# Patient Record
Sex: MALE | Race: WHITE | NOT HISPANIC OR LATINO | Employment: OTHER | URBAN - METROPOLITAN AREA
[De-identification: names, ages, dates, MRNs, and addresses within clinical notes are randomized per-mention and may not be internally consistent; named-entity substitution may affect disease eponyms.]

---

## 2017-10-01 ENCOUNTER — HOSPITAL ENCOUNTER (EMERGENCY)
Facility: HOSPITAL | Age: 62
Discharge: OTHER INSTITUTION | End: 2017-10-02
Attending: EMERGENCY MEDICINE
Payer: MEDICAID

## 2017-10-01 DIAGNOSIS — J18.9 PNEUMONIA OF BOTH LUNGS DUE TO INFECTIOUS ORGANISM, UNSPECIFIED PART OF LUNG: ICD-10-CM

## 2017-10-01 DIAGNOSIS — A41.9 SEPSIS, DUE TO UNSPECIFIED ORGANISM: Primary | ICD-10-CM

## 2017-10-01 DIAGNOSIS — R31.9 URINARY TRACT INFECTION WITH HEMATURIA, SITE UNSPECIFIED: ICD-10-CM

## 2017-10-01 DIAGNOSIS — N20.0 NEPHROLITHIASIS: ICD-10-CM

## 2017-10-01 DIAGNOSIS — Q07.8: ICD-10-CM

## 2017-10-01 DIAGNOSIS — T68.XXXA HYPOTHERMIA, INITIAL ENCOUNTER: ICD-10-CM

## 2017-10-01 DIAGNOSIS — N39.0 URINARY TRACT INFECTION WITH HEMATURIA, SITE UNSPECIFIED: ICD-10-CM

## 2017-10-01 DIAGNOSIS — D64.9 ANEMIA, UNSPECIFIED TYPE: ICD-10-CM

## 2017-10-01 LAB
ALBUMIN SERPL BCP-MCNC: 2.8 G/DL
ALP SERPL-CCNC: 192 U/L
ALT SERPL W/O P-5'-P-CCNC: 33 U/L
ANION GAP SERPL CALC-SCNC: 11 MMOL/L
APTT BLDCRRT: 31.8 SEC
AST SERPL-CCNC: 43 U/L
BASOPHILS # BLD AUTO: 0 K/UL
BASOPHILS NFR BLD: 0 %
BILIRUB SERPL-MCNC: 0.3 MG/DL
BILIRUB UR QL STRIP: ABNORMAL
BUN SERPL-MCNC: 7 MG/DL
CALCIUM SERPL-MCNC: 9.9 MG/DL
CHLORIDE SERPL-SCNC: 89 MMOL/L
CLARITY UR: ABNORMAL
CO2 SERPL-SCNC: 30 MMOL/L
COLOR UR: ABNORMAL
CREAT SERPL-MCNC: 0.5 MG/DL
DIFFERENTIAL METHOD: ABNORMAL
EOSINOPHIL # BLD AUTO: 0 K/UL
EOSINOPHIL NFR BLD: 0.2 %
ERYTHROCYTE [DISTWIDTH] IN BLOOD BY AUTOMATED COUNT: 15.2 %
EST. GFR  (AFRICAN AMERICAN): >60 ML/MIN/1.73 M^2
EST. GFR  (NON AFRICAN AMERICAN): >60 ML/MIN/1.73 M^2
GLUCOSE SERPL-MCNC: 86 MG/DL
GLUCOSE UR QL STRIP: ABNORMAL
HCT VFR BLD AUTO: 25.3 %
HGB BLD-MCNC: 8.7 G/DL
HGB UR QL STRIP: ABNORMAL
INR PPP: 1
KETONES UR QL STRIP: ABNORMAL
LACTATE SERPL-SCNC: 1 MMOL/L
LEUKOCYTE ESTERASE UR QL STRIP: ABNORMAL
LYMPHOCYTES # BLD AUTO: 0.7 K/UL
LYMPHOCYTES NFR BLD: 13.5 %
MCH RBC QN AUTO: 33.7 PG
MCHC RBC AUTO-ENTMCNC: 34.4 G/DL
MCV RBC AUTO: 98 FL
MICROSCOPIC COMMENT: ABNORMAL
MONOCYTES # BLD AUTO: 0.6 K/UL
MONOCYTES NFR BLD: 11.1 %
NEUTROPHILS # BLD AUTO: 4.1 K/UL
NEUTROPHILS NFR BLD: 75.2 %
NITRITE UR QL STRIP: ABNORMAL
NON-SQ EPI CELLS #/AREA URNS HPF: 1 /HPF
PH UR STRIP: ABNORMAL [PH] (ref 5–8)
PLATELET # BLD AUTO: 119 K/UL
PMV BLD AUTO: 11.4 FL
POTASSIUM SERPL-SCNC: 3.9 MMOL/L
PROT SERPL-MCNC: 7.5 G/DL
PROT UR QL STRIP: ABNORMAL
PROTHROMBIN TIME: 10.1 SEC
RBC # BLD AUTO: 2.58 M/UL
RBC #/AREA URNS HPF: >100 /HPF (ref 0–4)
SODIUM SERPL-SCNC: 130 MMOL/L
SP GR UR STRIP: ABNORMAL (ref 1–1.03)
URN SPEC COLLECT METH UR: ABNORMAL
UROBILINOGEN UR STRIP-ACNC: ABNORMAL EU/DL
WBC # BLD AUTO: 5.49 K/UL
WBC #/AREA URNS HPF: 20 /HPF (ref 0–5)

## 2017-10-01 PROCEDURE — 96367 TX/PROPH/DG ADDL SEQ IV INF: CPT

## 2017-10-01 PROCEDURE — 80053 COMPREHEN METABOLIC PANEL: CPT

## 2017-10-01 PROCEDURE — 87040 BLOOD CULTURE FOR BACTERIA: CPT

## 2017-10-01 PROCEDURE — 96365 THER/PROPH/DIAG IV INF INIT: CPT

## 2017-10-01 PROCEDURE — 83605 ASSAY OF LACTIC ACID: CPT

## 2017-10-01 PROCEDURE — 85610 PROTHROMBIN TIME: CPT

## 2017-10-01 PROCEDURE — 85025 COMPLETE CBC W/AUTO DIFF WBC: CPT

## 2017-10-01 PROCEDURE — 81000 URINALYSIS NONAUTO W/SCOPE: CPT

## 2017-10-01 PROCEDURE — 99285 EMERGENCY DEPT VISIT HI MDM: CPT | Mod: 25

## 2017-10-01 PROCEDURE — 25000003 PHARM REV CODE 250: Performed by: EMERGENCY MEDICINE

## 2017-10-01 PROCEDURE — 96361 HYDRATE IV INFUSION ADD-ON: CPT | Mod: 59

## 2017-10-01 PROCEDURE — 96360 HYDRATION IV INFUSION INIT: CPT

## 2017-10-01 PROCEDURE — 85730 THROMBOPLASTIN TIME PARTIAL: CPT

## 2017-10-01 RX ORDER — FAMOTIDINE 20 MG/1
20 TABLET, FILM COATED ORAL 2 TIMES DAILY
COMMUNITY

## 2017-10-01 RX ORDER — CALCITONIN SALMON 200 [IU]/.09ML
1 SPRAY, METERED NASAL DAILY
COMMUNITY

## 2017-10-01 RX ORDER — AZITHROMYCIN 250 MG/1
1000 TABLET, FILM COATED ORAL
Status: DISCONTINUED | OUTPATIENT
Start: 2017-10-02 | End: 2017-10-02

## 2017-10-01 RX ORDER — SIMETHICONE 80 MG
160 TABLET,CHEWABLE ORAL 2 TIMES DAILY
COMMUNITY

## 2017-10-01 RX ORDER — POTASSIUM CITRATE AND CITRIC ACID MONOHYDRATE 1100; 334 MG/5ML; MG/5ML
SOLUTION ORAL 2 TIMES DAILY
COMMUNITY

## 2017-10-01 RX ORDER — FERROUS SULFATE 220 (44)/5
220 SOLUTION, ORAL ORAL DAILY
COMMUNITY

## 2017-10-01 RX ORDER — ASPIRIN 81 MG/1
81 TABLET ORAL DAILY
COMMUNITY

## 2017-10-01 RX ORDER — LACTULOSE 10 G/15ML
10 SOLUTION ORAL; RECTAL NIGHTLY
COMMUNITY

## 2017-10-01 RX ORDER — SODIUM CHLORIDE 9 MG/ML
1000 INJECTION, SOLUTION INTRAVENOUS
Status: COMPLETED | OUTPATIENT
Start: 2017-10-01 | End: 2017-10-02

## 2017-10-01 RX ORDER — CIPROFLOXACIN 500 MG/1
500 TABLET ORAL
COMMUNITY

## 2017-10-01 RX ORDER — POLYETHYLENE GLYCOL 3350 17 G/17G
17 POWDER, FOR SOLUTION ORAL DAILY
COMMUNITY

## 2017-10-01 RX ORDER — LAMOTRIGINE 150 MG/1
25 TABLET ORAL DAILY
COMMUNITY

## 2017-10-01 RX ORDER — LANOLIN ALCOHOL/MO/W.PET/CERES
400 CREAM (GRAM) TOPICAL DAILY
COMMUNITY

## 2017-10-01 RX ADMIN — SODIUM CHLORIDE 1000 ML: 0.9 INJECTION, SOLUTION INTRAVENOUS at 09:10

## 2017-10-01 RX ADMIN — SODIUM CHLORIDE 1000 ML: 0.9 INJECTION, SOLUTION INTRAVENOUS at 07:10

## 2017-10-01 NOTE — ED TRIAGE NOTES
EMS states they were called for hematuria that has been going on for at least a month.  Group home states he was put on Cipro this past Friday.  Reportedly the hematuria became darker today.

## 2017-10-01 NOTE — ED PROVIDER NOTES
SCRIBE #1 NOTE: I, Juan Carlos Orona, am scribing for, and in the presence of, Franklin Rao MD. I have scribed the HPI, ROS, and PEx.     SCRIBE #2 NOTE: I, Kaitlin Dubois, am scribing for, and in the presence of,  Johnson Perez MD. I have scribed the remaining portions of the note not scribed by Scribe #1.     History      Chief Complaint   Patient presents with    Hematuria     for 1 month,  Recently started on Cipro this past Friday       Review of patient's allergies indicates:  Allergies not on file     HPI   HPI    10/1/2017, 6:53 PM   History obtained from the EMS      History of Present Illness: Guido Crowder is a 61 y.o. male patient who presents to the Emergency Department, from [facility] [place], for hematuria which onset gradually 1 month ago. Symptoms are constant and moderate in severity. Per EMS, the group home staff reports that the pt has been experiencing these sxs for aprox 1 month, but reports that it has worsened recently. Per EMS, the group home staff feels the pt is more altered than normal. No mitigating or exacerbating factors reported. No other associated sxs reported. Ems states that the pt started Cipro on Friday. Patient denies any fever, chills, dysuria, changes in urinary frequency/ urgency, abdomina pain, n/v/d, and all other sxs at this time. No further complaints or concerns at this time.         Arrival mode: EMS    PCP: No primary care provider on file.       Past Medical History:  Past Medical History:   Diagnosis Date    Dysphagia     Hyperlipidemia     Neurogenic bladder     Neuronal migration disorder     Osteoporosis     Seizures        Past Surgical History:  History reviewed. No pertinent surgical history.      Family History:  History reviewed. No pertinent family history.    Social History:  Social History     Social History Main Topics    Smoking status: Never Smoker    Smokeless tobacco: Never Used    Alcohol use No    Drug use: No    Sexual activity: No        ROS   Review of Systems   Constitutional: Negative for chills and fever.   Respiratory: Negative for shortness of breath.    Cardiovascular: Negative for chest pain.   Gastrointestinal: Negative for abdominal pain, constipation, diarrhea, nausea and vomiting.   Genitourinary: Positive for hematuria. Negative for difficulty urinating, dysuria, flank pain, frequency and urgency.   Musculoskeletal: Negative for back pain.   Skin: Negative for rash.   Neurological: Negative for dizziness, syncope, weakness and headaches.   All other systems reviewed and are negative.      Physical Exam      Initial Vitals   BP Pulse Resp Temp SpO2   10/01/17 1855 10/01/17 1855 10/01/17 1855 10/01/17 2047 10/01/17 1855   (!) 138/56 70 18 (!) 93.3 °F (34.1 °C) 98 %      MAP       10/01/17 1855       83.33           Vitals:    10/02/17 0131   BP: 102/61   Pulse: 96   Resp:    Temp:          Physical Exam  Nursing Notes and Vital Signs Reviewed.  Constitutional: Patient is in no apparent distress. Well-developed and well-nourished.  Head: Atraumatic. Normocephalic.  Eyes: PERRL. EOM intact. Conjunctivae are not pale. No scleral icterus.  ENT: Mucous membranes are dry. Oropharynx is clear and symmetric.    Neck: Supple. Full ROM. No lymphadenopathy.  Cardiovascular: Regular rate. Regular rhythm. No murmurs, rubs, or gallops. Distal pulses are 2+ and symmetric.  Pulmonary/Chest: No respiratory distress. Clear to auscultation bilaterally. No wheezing, rales, or rhonchi.  Abdominal: Soft. Slight abdominal distension and tenderness appreciated. No rebound, guarding, or rigidity. Good bowel sounds. Suprapubic cathter in place  : External inspection is normal. No blood visualized on the penis.  Genitourinary: No CVA tenderness  Musculoskeletal: Moves all extremities. No obvious deformities. No edema. No calf tenderness.  Skin: Warm and dry.  Neurological:  Alert, awake, and appropriate.  Normal speech.  No acute focal neurological deficits  "are appreciated.  Psychiatric: Normal affect. Good eye contact. Appropriate in content.    ED Course    Critical Care  Date/Time: 10/2/2017 12:44 AM  Performed by: OCTAVIO ZAPATA  Authorized by: OCTAVIO ZAPATA   Direct patient critical care time: 8 minutes  Additional history critical care time: 8 minutes  Ordering / reviewing critical care time: 8 minutes  Documentation critical care time: 8 minutes  Consulting other physicians critical care time: 8 minutes  Total critical care time (exclusive of procedural time) : 40 minutes  Critical care time was exclusive of separately billable procedures and treating other patients.  Critical care was necessary to treat or prevent imminent or life-threatening deterioration of the following conditions: sepsis.  Critical care was time spent personally by me on the following activities: blood draw for specimens, development of treatment plan with patient or surrogate, discussions with consultants, discussions with primary provider, evaluation of patient's response to treatment, examination of patient, obtaining history from patient or surrogate, ordering and performing treatments and interventions, ordering and review of laboratory studies, ordering and review of radiographic studies and re-evaluation of patient's condition.  Subsequent provider of critical care: I assumed direction of critical care for this patient from another provider of my specialty.        ED Vital Signs:  Vitals:    10/01/17 1855 10/01/17 1935 10/01/17 1948 10/01/17 2023   BP: (!) 138/56   (!) 115/54   Pulse: 70 72 66 68   Resp: 18      Temp:       TempSrc:       SpO2: 98%  99% 99%   Weight:       Height:        10/01/17 2031 10/01/17 2047 10/01/17 2121 10/01/17 2141   BP:   121/68 118/62   Pulse:   68 70   Resp:       Temp:  (!) 93.3 °F (34.1 °C)     TempSrc:  Core Rectal     SpO2:   96% 96%   Weight: 64 kg (141 lb)      Height: 5' 1.5" (1.562 m)       10/01/17 2241 10/02/17 0001 10/02/17 0031 " 10/02/17 0131   BP: (!) 119/53 119/81 (!) 120/50 102/61   Pulse: 72 87 82 96   Resp:       Temp:  (!) 95 °F (35 °C)     TempSrc:  Core Rectal     SpO2: 98% 99% 97% 97%   Weight:       Height:           Abnormal Lab Results:  Labs Reviewed   CBC W/ AUTO DIFFERENTIAL - Abnormal; Notable for the following:        Result Value    RBC 2.58 (*)     Hemoglobin 8.7 (*)     Hematocrit 25.3 (*)     MCH 33.7 (*)     RDW 15.2 (*)     Platelets 119 (*)     Lymph # 0.7 (*)     Gran% 75.2 (*)     Lymph% 13.5 (*)     All other components within normal limits   COMPREHENSIVE METABOLIC PANEL - Abnormal; Notable for the following:     Sodium 130 (*)     Chloride 89 (*)     CO2 30 (*)     Albumin 2.8 (*)     Alkaline Phosphatase 192 (*)     AST 43 (*)     All other components within normal limits   URINALYSIS - Abnormal; Notable for the following:     Color, UA Red (*)     Appearance, UA Cloudy (*)     All other components within normal limits   URINALYSIS MICROSCOPIC - Abnormal; Notable for the following:     RBC, UA >100 (*)     WBC, UA 20 (*)     Non-Squam Epith 1 (*)     All other components within normal limits   CULTURE, BLOOD   CULTURE, BLOOD   PROTIME-INR   APTT   LACTIC ACID, PLASMA        All Lab Results:  Results for orders placed or performed during the hospital encounter of 10/01/17   CBC auto differential   Result Value Ref Range    WBC 5.49 3.90 - 12.70 K/uL    RBC 2.58 (L) 4.60 - 6.20 M/uL    Hemoglobin 8.7 (L) 14.0 - 18.0 g/dL    Hematocrit 25.3 (L) 40.0 - 54.0 %    MCV 98 82 - 98 fL    MCH 33.7 (H) 27.0 - 31.0 pg    MCHC 34.4 32.0 - 36.0 g/dL    RDW 15.2 (H) 11.5 - 14.5 %    Platelets 119 (L) 150 - 350 K/uL    MPV 11.4 9.2 - 12.9 fL    Gran # 4.1 1.8 - 7.7 K/uL    Lymph # 0.7 (L) 1.0 - 4.8 K/uL    Mono # 0.6 0.3 - 1.0 K/uL    Eos # 0.0 0.0 - 0.5 K/uL    Baso # 0.00 0.00 - 0.20 K/uL    Gran% 75.2 (H) 38.0 - 73.0 %    Lymph% 13.5 (L) 18.0 - 48.0 %    Mono% 11.1 4.0 - 15.0 %    Eosinophil% 0.2 0.0 - 8.0 %    Basophil%  0.0 0.0 - 1.9 %    Differential Method Automated    Comprehensive metabolic panel   Result Value Ref Range    Sodium 130 (L) 136 - 145 mmol/L    Potassium 3.9 3.5 - 5.1 mmol/L    Chloride 89 (L) 95 - 110 mmol/L    CO2 30 (H) 23 - 29 mmol/L    Glucose 86 70 - 110 mg/dL    BUN, Bld 7 6 - 20 mg/dL    Creatinine 0.5 0.5 - 1.4 mg/dL    Calcium 9.9 8.7 - 10.5 mg/dL    Total Protein 7.5 6.0 - 8.4 g/dL    Albumin 2.8 (L) 3.5 - 5.2 g/dL    Total Bilirubin 0.3 0.1 - 1.0 mg/dL    Alkaline Phosphatase 192 (H) 55 - 135 U/L    AST 43 (H) 10 - 40 U/L    ALT 33 10 - 44 U/L    Anion Gap 11 8 - 16 mmol/L    eGFR if African American >60 >60 mL/min/1.73 m^2    eGFR if non African American >60 >60 mL/min/1.73 m^2   Protime-INR   Result Value Ref Range    Prothrombin Time 10.1 9.0 - 12.5 sec    INR 1.0 0.8 - 1.2   APTT   Result Value Ref Range    aPTT 31.8 21.0 - 32.0 sec   Urinalysis Catheterized   Result Value Ref Range    Specimen UA Urine, Catheterized     Color, UA Red (A) Yellow, Straw, Sabrina    Appearance, UA Cloudy (A) Clear    pH, UA SEE COMMENT 5.0 - 8.0    Specific Gravity, UA SEE COMMENT 1.005 - 1.030    Protein, UA SEE COMMENT Negative    Glucose, UA SEE COMMENT Negative    Ketones, UA SEE COMMENT Negative    Bilirubin (UA) SEE COMMENT Negative    Occult Blood UA SEE COMMENT Negative    Nitrite, UA SEE COMMENT Negative    Urobilinogen, UA SEE COMMENT <2.0 EU/dL    Leukocytes, UA SEE COMMENT Negative   Urinalysis Microscopic   Result Value Ref Range    RBC, UA >100 (H) 0 - 4 /hpf    WBC, UA 20 (H) 0 - 5 /hpf    Non-Squam Epith 1 (A) <1/hpf /hpf    Microscopic Comment SEE COMMENT    Lactic acid, plasma   Result Value Ref Range    Lactate (Lactic Acid) 1.0 0.5 - 2.2 mmol/L       Imaging Results:  Imaging Results          X-Ray Chest AP Portable (Final result)  Result time 10/01/17 21:34:39    Final result by Chas May MD (10/01/17 21:34:39)                 Impression:      Patchy infiltrate/pneumonia right mid lung and  left lung base.      Electronically signed by: MISHEL MAY MD  Date:     10/01/17  Time:    21:34              Narrative:    EXAM: XR CHEST AP PORTABLE    CLINICAL HISTORY: hypothermia.    COMPARISON STUDIES: none    FINDINGS:  The cardiomediastinal silhouette is within normal limits.  Patchy infiltrate left lung base and right midlung.  Device with battery pack superior left axilla with leads extending into the lower neck.    Mild dextroscoliosis with multilevel spondylosis                             CT Renal Stone Study Abd Pelvis WO (Final result)  Result time 10/01/17 20:27:29    Final result by Mishel May MD (10/01/17 20:27:29)                 Impression:      Bilateral nephrolithiasis.  Numerous nonobstructing calculi with postsurgical changes on the right - negative for ureteral calculus or hydronephrosis.    Suprapubic catheter with the bladder mostly collapsed.  Large amount of probable clot/blood in the bladder.  Certainly underlying mass cannot be excluded.    Patchy nodular at  the lung bases most suggestive of pneumonia.  This will need imaging followup.    Other chronic findings as described.            All CT scans at this facility use dose modulation, iterative reconstruction, and/or weight based dosing when appropriate to reduce radiation dose to as low as reasonably achievable.      Electronically signed by: MISHEL MAY MD  Date:     10/01/17  Time:    20:27              Narrative:    EXAM: CT RENAL STONE STUDY ABD PELVIS WO    CLINICAL HISTORY: hematuria     TECHNIQUE: Axial CT scan through the abdomen and pelvis without IV and without oral contrast.    COMPARISON STUDIES: none    FINDINGS:    Abdomen CT:   There are numerous small nonobstructing calculi throughout the kidneys.  The largest in the right renal pelvis is approximately 9 mm.  Small linear tract extending posteriorly from this with some small calcifications and/or surgical clips in it suggesting previous intervention.  There is  no hydronephrosis or ureteral calculus.    Patchy areas of nodular consolidation at the lung bases primarily in the dependent lower lobes.  Coronary arterial calcifications.  There is some colonic interposition between the liver and the right anterior abdominal wall.  No contour deformities of the abdominal viscera.  Atrophic pancreas.  Tiny gallstones.  Gastrostomy tube present.  Stomach moderately distended with fluid.  Nonobstructed gas pattern.  Colonic diverticulosis.  Significant calcified plaque  vasculature.  Negative aortic aneurysm.    Pelvis CT:   Suprapubic catheter in the bladder which is mostly collapsed.  There is a large amount of dense material around a Espinosa catheter likely indicative of blood.  Prostate is normal size.  There are no pelvic fluid collections or adenopathy.  Moderately distended rectosigmoid with fluid and stool.    Arthritic changes in the spine.  Changes also present suggesting ankylosing spondylitis.                             CT Head Without Contrast (Final result)  Result time 10/01/17 20:06:55    Final result by Mishel May MD (10/01/17 20:06:55)                 Impression:      Negative for acute intracranial abnormality.          All CT scans at this facility use dose modulation, iterative reconstruction, and/or weight based dosing when appropriate to reduce radiation dose to as low as reasonably achievable.      Electronically signed by: MISHEL MAY MD  Date:     10/01/17  Time:    20:06              Narrative:    EXAM: CT HEAD WITHOUT CONTRAST    CLINICAL HISTORY:  Transient alteration of awareness    TECHNIQUE: Axial noncontrast head CT images obtained.    COMPARISON STUDIES: none    FINDINGS:  Negative for acute hemorrhage, extra-axial collection, mass effect.   Good gray/white matter differentiation.  Skull is intact with no fractures identified.   Paranasal sinuses appear clear.    Some images limited by motion.                                      The Emergency  Provider reviewed the vital signs and test results, which are outlined above.    ED Discussion     8:14 PM: Dr. Rao transfers care of pt to Dr. Perez, pending lab and imaging results.    8:50 PM: Pt's initial vitals now demonstrate temperature of 93.3F rectal. Due to apparent systemic response, sepsis workup will be initiated. Will begin warming with fluid warmer and merrill hugger.    11:30 PM: Re-evaluated pt. Pt is resting comfortably and is in no acute distress. Findings support a diagnosis of sepsis but not severe sepsis due to a combination of pneumonia and UTI.  There are also concerns regarding anemia given ongoing hematuria.  Catheter has been irrigated to clear twice but patient continues to have bleeding.    11:40 PM: Discussed case with Brown Jaramillo, NP (Hospital Medicine), agrees with current care and management of pt and accepts admission. Azithromycin and Rocephin was recommended.   Admitting Service: Hospital medicine   Admitting Physician: Dr. Tillman  Admit to: ICU    12:56 AM:  Re-evaluated pt. On exam, PEG tube and suprapubic catheter in place, both sites are free of signs of infection. Dr. Tillman at bedside. Dr. Tillman recommends transferring patient to facility with urology due to ongoing hematuria and anemia.     1:33 AM: Consult with Dr. Sykes (ER physician) at [facility] concerning pt. There are no urology services, which the patient requires, offered at [facility] at this time. Dr. Sykes expresses understanding and will accept transfer.  Accepting Facility: [facility]  Accepting Physician: Dr. Sykes (via [facility])      ED Medication(s):  Medications   metronidazole IVPB 500 mg (500 mg Intravenous New Bag 10/2/17 0138)   0.9%  NaCl infusion (1,000 mLs Intravenous New Bag 10/1/17 1927)   sodium chloride 0.9% bolus 1,000 mL (0 mLs Intravenous Stopped 10/1/17 2248)   sodium chloride 0.9% bolus 1,000 mL (0 mLs Intravenous Stopped 10/1/17 2148)   cefTRIAXone (ROCEPHIN) 1 g in dextrose 5 % 50 mL IVPB (0 g  Intravenous Stopped 10/2/17 0045)   azithromycin 500 mg in dextrose 5 % 250 mL IVPB (ready to mix system) (0 mg Intravenous Stopped 10/2/17 0114)       New Prescriptions    No medications on file             Medical Decision Making    Medical Decision Making:   Clinical Tests:   Lab Tests: Ordered and Reviewed  Radiological Study: Ordered and Reviewed           Scribe Attestation:   Scribe #1: I performed the above scribed service and the documentation accurately describes the services I performed. I attest to the accuracy of the note.    Attending:   Physician Attestation Statement for Scribe #1: I, Franklin Rao MD, personally performed the services described in this documentation, as scribed by Juan Carlos Orona, in my presence, and it is both accurate and complete.       Scribe Attestation:   Scribe #2: I performed the above scribed service and the documentation accurately describes the services I performed. I attest to the accuracy of the note.    Attending Attestation:           Physician Attestation for Scribe:    Physician Attestation Statement for Scribe #2: I, Johnson Perez MD, reviewed documentation, as scribed by Kaitlin Dubois in my presence, and it is both accurate and complete. I also acknowledge and confirm the content of the note done by Scribe #1.          Clinical Impression       ICD-10-CM ICD-9-CM   1. Sepsis, due to unspecified organism A41.9 038.9     995.91   2. Pneumonia of both lungs due to infectious organism, unspecified part of lung J18.9 483.8   3. Urinary tract infection with hematuria, site unspecified N39.0 599.0    R31.9    4. Nephrolithiasis N20.0 592.0   5. Neuronal migration disorder Q07.8 742.8   6. Hypothermia, initial encounter T68.XXXA 991.6   7. Anemia, unspecified type D64.9 285.9       Disposition:   Disposition: Transferred  Condition: Fair  Reason for referral: No urology services available at this time         Johnson Perez MD  10/02/17 0144

## 2017-10-02 VITALS
OXYGEN SATURATION: 97 % | BODY MASS INDEX: 25.95 KG/M2 | SYSTOLIC BLOOD PRESSURE: 102 MMHG | RESPIRATION RATE: 18 BRPM | TEMPERATURE: 97 F | HEART RATE: 96 BPM | WEIGHT: 141 LBS | DIASTOLIC BLOOD PRESSURE: 61 MMHG | HEIGHT: 62 IN

## 2017-10-02 PROCEDURE — 96367 TX/PROPH/DG ADDL SEQ IV INF: CPT

## 2017-10-02 PROCEDURE — 63600175 PHARM REV CODE 636 W HCPCS: Performed by: EMERGENCY MEDICINE

## 2017-10-02 PROCEDURE — 25000003 PHARM REV CODE 250: Performed by: EMERGENCY MEDICINE

## 2017-10-02 PROCEDURE — S0030 INJECTION, METRONIDAZOLE: HCPCS | Performed by: EMERGENCY MEDICINE

## 2017-10-02 PROCEDURE — 96365 THER/PROPH/DIAG IV INF INIT: CPT | Mod: 59

## 2017-10-02 RX ORDER — METRONIDAZOLE 500 MG/100ML
500 INJECTION, SOLUTION INTRAVENOUS
Status: COMPLETED | OUTPATIENT
Start: 2017-10-02 | End: 2017-10-02

## 2017-10-02 RX ADMIN — CEFTRIAXONE 1 G: 1 INJECTION, SOLUTION INTRAVENOUS at 12:10

## 2017-10-02 RX ADMIN — METRONIDAZOLE 500 MG: 500 INJECTION, SOLUTION INTRAVENOUS at 01:10

## 2017-10-02 RX ADMIN — AZITHROMYCIN MONOHYDRATE 500 MG: 500 INJECTION, POWDER, LYOPHILIZED, FOR SOLUTION INTRAVENOUS at 12:10

## 2017-10-02 NOTE — ED NOTES
Bladder irrigation performed, 600ml of sterile water used, clots noted upon irrigation, clear at end of irrigation.

## 2017-10-02 NOTE — ED NOTES
Bed rails are up and call light is within patient reach. Bed is low and locked. Will continue to monitor.

## 2017-10-07 LAB
BACTERIA BLD CULT: NORMAL
BACTERIA BLD CULT: NORMAL